# Patient Record
(demographics unavailable — no encounter records)

---

## 2024-12-19 NOTE — PROCEDURE
[FreeTextEntry3] : Large Joint Injection / Aspiration: Celestone, Lidocaine, Marcaine and Guidance Ultrasound Large Joint Injection was performed because of pain and inflammation. Anesthesia: ethyl chloride sprayed topically..  Celestone: An injection of Celestone 12 mg , 2 cc. Needle size: 22 gauge ,  Lidocaine: 3 cc.  Marcaine: 3 cc.   Medication was injected in the left knee. Patient has tried OTC's including aspirin, Ibuprofen, Aleve etc or prescription NSAIDS, and/or exercises at home and/ or physical therapy without satisfactory response and Patient has decreased mobility in the joint. After verbal consent using sterile preparation and technique. The risks, benefits, and alternatives to cortisone injection were explained in full to the patient. Risks outlined include but are not limited to infection, sepsis, bleeding, scarring, skin discoloration, temporary increase in pain, syncopal episode, failure to resolve symptoms, allergic reaction, symptom recurrence, and elevation of blood sugar in diabetics. Patient understood the risks. All questions were answered. After discussion of options, patient requested an injection. Oral informed consent was obtained and sterile prep was done of the injection site. Sterile technique was utilized for the procedure including the preparation of the solutions used for the injection. Patient tolerated the procedure well. Advised to ice the injection site this evening. Prep with betadine locally to site. Sterile technique used. Patient tolerated procedure well. Post Procedure Instructions: Patient was advised to call if redness, pain, or fever occur and apply ice for 15 min. out of every hour for the next 12-24 hours as tolerated. patient was advised to rest the joint(s) for 2 days.   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   Ultrasound guided injection was performed of the knee, visualization of the needle and placement of injection was performed without complication.

## 2024-12-19 NOTE — PHYSICAL EXAM
[5___] : hamstring 5[unfilled]/5 [Left] : left knee [All Views] : anteroposterior, lateral, skyline, and anteroposterior standing [Moderate tricompartmental OA medial narrowing] : Moderate tricompartmental OA medial narrowing [] : no calf tenderness [FreeTextEntry9] : unchanged [TWNoteComboBox7] : flexion 110 degrees [de-identified] : extension 0 degrees

## 2024-12-19 NOTE — DISCUSSION/SUMMARY
[de-identified] : Patient allowed to gently start resuming activities. Discussed change to medication prescription and usage. Offered cortisone steroid injection.for pain control Bracing options discussed with patient. Hyaluronic Acid inj pamphlet given to pt. try topical lidocaine for pain reviewed current medications being used by this patient Home exercise for functional improvement   12/19/2024  RE:  FLORESITA GALLARDO   Acct #- [id number]  Attention:  Nurse Reviewer /Medical Director  I am writing this letter as a medical necessity for HA orthovisc L knee Patient has tried analgesics, non-steroid anti-inflammatory agents,  physical therapy, hot or cold compresses,injections of corticosteroids, etc)  which in combination or by themselves has not worked. Based on my patient's condition, I strongly believe that the Hyaluronic aid injections is medically needed.   Thank you for your time and consideration.

## 2024-12-19 NOTE — HISTORY OF PRESENT ILLNESS
[8] : 8 [3] : 3 [Dull/Aching] : dull/aching [Sharp] : sharp [Shooting] : shooting [Frequent] : frequent [Leisure] : leisure [Rest] : rest [Meds] : meds [Ice] : ice [Injection therapy] : injection therapy [Standing] : standing [Walking] : walking [Stairs] : stairs [de-identified] : Patient returns today for her left knee, history of OA, completed visco 6/2023 with relief until about two weeks ago. No recent injury. Pain with walking, stairs, getting up from a seated position, kneeling.  [] : no [FreeTextEntry1] : Left Knee [FreeTextEntry5] : Patient is having a flareup on the arthritis pain. patient was treated with gel shots and states they were working up until recently. [de-identified] : kneeling [de-identified] : Dr. Camp

## 2025-06-11 NOTE — HISTORY OF PRESENT ILLNESS
[Neck] : neck [Upper back] : upper back [Left Arm] : left arm [Right Arm] : right arm [8] : 8 [7] : 7 [Burning] : burning [Dull/Aching] : dull/aching [Radiating] : radiating [Sharp] : sharp [Shooting] : shooting [Throbbing] : throbbing [Tightness] : tightness [Tingling] : tingling [Intermittent] : intermittent [Household chores] : household chores [Leisure] : leisure [Sleep] : sleep [Meds] : meds [Sitting] : sitting [Standing] : standing [Bending forward] : bending forward [Lying in bed] : lying in bed [] : no [FreeTextEntry1] : Bilateral shoulders  [FreeTextEntry6] : numbness and tingling in bilateral legs  [FreeTextEntry7] : Bilateral arms  [FreeTextEntry9] : Lidocaine patches; Tylenol  [de-identified] : lifting  [de-identified] : x-ray (NO MRI)

## 2025-06-11 NOTE — PHYSICAL EXAM
[de-identified] : Gen: NAD Head: NC/AT Neck: +spurling's on the LEFT Eyes: no glasses, no scleral icterus ENT: mucous membranes moist CV: no JVD Lungs: nonlabored breathing Abd: soft, NT/ND Ext: RUE: +empty can test, limited internal rotation and abduction  Neuro: CN intact UEs +5 L +5 R shoulder abduction +5 L +5 R arm abduction +5 L +5 R forearm flexion +5 L +5 R forearm extension +5 L +5 R finger flexion +5 L +5 R  strength Psych: normal affect Skin: no visible lesions

## 2025-06-11 NOTE — DISCUSSION/SUMMARY
[de-identified] : FLORESITA GALLARDO is a 70 year-old woman presenting for a NPV for a history of neck/shoulder pain.  Prior treatment: CSI right shoulder OTC ibuprofen Acetaminophen  Plan: 1) X-ray cervical spine images reviewed 2) Initiate physical therapy - script provided 3) Continue ibuprofen OTC prn 4) Continue acetaminophen prn 5) Consider MRI cervical spine if needed 6) RTC 6-8 weeks

## 2025-06-11 NOTE — HISTORY OF PRESENT ILLNESS
[Neck] : neck [Upper back] : upper back [Left Arm] : left arm [Right Arm] : right arm [8] : 8 [7] : 7 [Burning] : burning [Dull/Aching] : dull/aching [Radiating] : radiating [Sharp] : sharp [Shooting] : shooting [Throbbing] : throbbing [Tightness] : tightness [Tingling] : tingling [Intermittent] : intermittent [Household chores] : household chores [Leisure] : leisure [Sleep] : sleep [Meds] : meds [Sitting] : sitting [Standing] : standing [Bending forward] : bending forward [Lying in bed] : lying in bed [] : no [FreeTextEntry1] : Bilateral shoulders  [FreeTextEntry6] : numbness and tingling in bilateral legs  [FreeTextEntry7] : Bilateral arms  [FreeTextEntry9] : Lidocaine patches; Tylenol  [de-identified] : lifting  [de-identified] : x-ray (NO MRI)

## 2025-06-11 NOTE — DISCUSSION/SUMMARY
[de-identified] : FLORESITA GALLARDO is a 70 year-old woman presenting for a NPV for a history of neck/shoulder pain.  Prior treatment: CSI right shoulder OTC ibuprofen Acetaminophen  Plan: 1) X-ray cervical spine images reviewed 2) Initiate physical therapy - script provided 3) Continue ibuprofen OTC prn 4) Continue acetaminophen prn 5) Consider MRI cervical spine if needed 6) RTC 6-8 weeks

## 2025-06-11 NOTE — PHYSICAL EXAM
[de-identified] : Gen: NAD Head: NC/AT Neck: +spurling's on the LEFT Eyes: no glasses, no scleral icterus ENT: mucous membranes moist CV: no JVD Lungs: nonlabored breathing Abd: soft, NT/ND Ext: RUE: +empty can test, limited internal rotation and abduction  Neuro: CN intact UEs +5 L +5 R shoulder abduction +5 L +5 R arm abduction +5 L +5 R forearm flexion +5 L +5 R forearm extension +5 L +5 R finger flexion +5 L +5 R  strength Psych: normal affect Skin: no visible lesions